# Patient Record
Sex: MALE | Race: WHITE | NOT HISPANIC OR LATINO | Employment: FULL TIME | URBAN - METROPOLITAN AREA
[De-identification: names, ages, dates, MRNs, and addresses within clinical notes are randomized per-mention and may not be internally consistent; named-entity substitution may affect disease eponyms.]

---

## 2024-05-16 ENCOUNTER — HOSPITAL ENCOUNTER (INPATIENT)
Facility: HOSPITAL | Age: 64
LOS: 2 days | Discharge: HOME | DRG: 244 | End: 2024-05-18
Attending: EMERGENCY MEDICINE | Admitting: SURGERY
Payer: COMMERCIAL

## 2024-05-16 ENCOUNTER — APPOINTMENT (EMERGENCY)
Dept: CT IMAGING | Facility: HOSPITAL | Age: 64
DRG: 244 | End: 2024-05-16
Attending: EMERGENCY MEDICINE
Payer: COMMERCIAL

## 2024-05-16 DIAGNOSIS — K57.20 PERFORATION OF SIGMOID COLON DUE TO DIVERTICULITIS: Primary | ICD-10-CM

## 2024-05-16 LAB
ALBUMIN SERPL BCP-MCNC: 3.8 G/DL (ref 3.5–5)
ALP SERPL-CCNC: 44 U/L (ref 34–104)
ALT SERPL W P-5'-P-CCNC: 8 U/L (ref 7–52)
ANION GAP SERPL CALCULATED.3IONS-SCNC: 9 MMOL/L (ref 4–13)
APTT PPP: 38 SECONDS (ref 23–37)
AST SERPL W P-5'-P-CCNC: 10 U/L (ref 13–39)
ATRIAL RATE: 94 BPM
BACTERIA UR QL AUTO: ABNORMAL /HPF
BASOPHILS # BLD AUTO: 0.02 THOUSANDS/ÂΜL (ref 0–0.1)
BASOPHILS NFR BLD AUTO: 0 % (ref 0–1)
BILIRUB SERPL-MCNC: 0.8 MG/DL (ref 0.2–1)
BILIRUB UR QL STRIP: ABNORMAL
BUN SERPL-MCNC: 16 MG/DL (ref 5–25)
CALCIUM SERPL-MCNC: 9.3 MG/DL (ref 8.4–10.2)
CHLORIDE SERPL-SCNC: 101 MMOL/L (ref 96–108)
CLARITY UR: CLEAR
CO2 SERPL-SCNC: 25 MMOL/L (ref 21–32)
COLOR UR: ABNORMAL
CREAT SERPL-MCNC: 1.01 MG/DL (ref 0.6–1.3)
EOSINOPHIL # BLD AUTO: 0.07 THOUSAND/ÂΜL (ref 0–0.61)
EOSINOPHIL NFR BLD AUTO: 1 % (ref 0–6)
ERYTHROCYTE [DISTWIDTH] IN BLOOD BY AUTOMATED COUNT: 11.9 % (ref 11.6–15.1)
GFR SERPL CREATININE-BSD FRML MDRD: 79 ML/MIN/1.73SQ M
GLUCOSE SERPL-MCNC: 143 MG/DL (ref 65–140)
GLUCOSE UR STRIP-MCNC: ABNORMAL MG/DL
HCT VFR BLD AUTO: 42.2 % (ref 36.5–49.3)
HGB BLD-MCNC: 14.3 G/DL (ref 12–17)
HGB UR QL STRIP.AUTO: ABNORMAL
IMM GRANULOCYTES # BLD AUTO: 0.06 THOUSAND/UL (ref 0–0.2)
IMM GRANULOCYTES NFR BLD AUTO: 1 % (ref 0–2)
INR PPP: 1.21 (ref 0.84–1.19)
KETONES UR STRIP-MCNC: ABNORMAL MG/DL
LACTATE SERPL-SCNC: 0.7 MMOL/L (ref 0.5–2)
LEUKOCYTE ESTERASE UR QL STRIP: NEGATIVE
LIPASE SERPL-CCNC: <6 U/L (ref 11–82)
LYMPHOCYTES # BLD AUTO: 1.09 THOUSANDS/ÂΜL (ref 0.6–4.47)
LYMPHOCYTES NFR BLD AUTO: 10 % (ref 14–44)
MCH RBC QN AUTO: 30 PG (ref 26.8–34.3)
MCHC RBC AUTO-ENTMCNC: 33.9 G/DL (ref 31.4–37.4)
MCV RBC AUTO: 89 FL (ref 82–98)
MONOCYTES # BLD AUTO: 0.77 THOUSAND/ÂΜL (ref 0.17–1.22)
MONOCYTES NFR BLD AUTO: 7 % (ref 4–12)
MUCOUS THREADS UR QL AUTO: ABNORMAL
NEUTROPHILS # BLD AUTO: 9.38 THOUSANDS/ÂΜL (ref 1.85–7.62)
NEUTS SEG NFR BLD AUTO: 81 % (ref 43–75)
NITRITE UR QL STRIP: NEGATIVE
NON-SQ EPI CELLS URNS QL MICRO: ABNORMAL /HPF
NRBC BLD AUTO-RTO: 0 /100 WBCS
P AXIS: 83 DEGREES
PH UR STRIP.AUTO: 6 [PH]
PLATELET # BLD AUTO: 351 THOUSANDS/UL (ref 149–390)
PMV BLD AUTO: 10.1 FL (ref 8.9–12.7)
POTASSIUM SERPL-SCNC: 3.8 MMOL/L (ref 3.5–5.3)
PR INTERVAL: 142 MS
PROCALCITONIN SERPL-MCNC: 0.79 NG/ML
PROT SERPL-MCNC: 7.9 G/DL (ref 6.4–8.4)
PROT UR STRIP-MCNC: ABNORMAL MG/DL
PROTHROMBIN TIME: 15.8 SECONDS (ref 11.6–14.5)
QRS AXIS: 50 DEGREES
QRSD INTERVAL: 90 MS
QT INTERVAL: 384 MS
QTC INTERVAL: 480 MS
RBC # BLD AUTO: 4.77 MILLION/UL (ref 3.88–5.62)
RBC #/AREA URNS AUTO: ABNORMAL /HPF
SODIUM SERPL-SCNC: 135 MMOL/L (ref 135–147)
SP GR UR STRIP.AUTO: >=1.03 (ref 1–1.03)
T WAVE AXIS: 68 DEGREES
UROBILINOGEN UR STRIP-ACNC: 2 MG/DL
VENTRICULAR RATE: 94 BPM
WBC # BLD AUTO: 11.39 THOUSAND/UL (ref 4.31–10.16)
WBC #/AREA URNS AUTO: ABNORMAL /HPF

## 2024-05-16 PROCEDURE — 74177 CT ABD & PELVIS W/CONTRAST: CPT

## 2024-05-16 PROCEDURE — NC001 PR NO CHARGE

## 2024-05-16 PROCEDURE — 87040 BLOOD CULTURE FOR BACTERIA: CPT | Performed by: EMERGENCY MEDICINE

## 2024-05-16 PROCEDURE — 36415 COLL VENOUS BLD VENIPUNCTURE: CPT

## 2024-05-16 PROCEDURE — 84145 PROCALCITONIN (PCT): CPT | Performed by: EMERGENCY MEDICINE

## 2024-05-16 PROCEDURE — 93010 ELECTROCARDIOGRAM REPORT: CPT | Performed by: INTERNAL MEDICINE

## 2024-05-16 PROCEDURE — 85025 COMPLETE CBC W/AUTO DIFF WBC: CPT | Performed by: EMERGENCY MEDICINE

## 2024-05-16 PROCEDURE — 99222 1ST HOSP IP/OBS MODERATE 55: CPT | Performed by: SURGERY

## 2024-05-16 PROCEDURE — 96365 THER/PROPH/DIAG IV INF INIT: CPT

## 2024-05-16 PROCEDURE — 99284 EMERGENCY DEPT VISIT MOD MDM: CPT

## 2024-05-16 PROCEDURE — 99285 EMERGENCY DEPT VISIT HI MDM: CPT | Performed by: EMERGENCY MEDICINE

## 2024-05-16 PROCEDURE — 83605 ASSAY OF LACTIC ACID: CPT | Performed by: EMERGENCY MEDICINE

## 2024-05-16 PROCEDURE — 83690 ASSAY OF LIPASE: CPT | Performed by: EMERGENCY MEDICINE

## 2024-05-16 PROCEDURE — 81001 URINALYSIS AUTO W/SCOPE: CPT | Performed by: EMERGENCY MEDICINE

## 2024-05-16 PROCEDURE — 96361 HYDRATE IV INFUSION ADD-ON: CPT

## 2024-05-16 PROCEDURE — 85730 THROMBOPLASTIN TIME PARTIAL: CPT | Performed by: EMERGENCY MEDICINE

## 2024-05-16 PROCEDURE — 80053 COMPREHEN METABOLIC PANEL: CPT | Performed by: EMERGENCY MEDICINE

## 2024-05-16 PROCEDURE — 85610 PROTHROMBIN TIME: CPT | Performed by: EMERGENCY MEDICINE

## 2024-05-16 PROCEDURE — 93005 ELECTROCARDIOGRAM TRACING: CPT

## 2024-05-16 RX ORDER — ONDANSETRON 2 MG/ML
4 INJECTION INTRAMUSCULAR; INTRAVENOUS EVERY 6 HOURS PRN
Status: DISCONTINUED | OUTPATIENT
Start: 2024-05-16 | End: 2024-05-18 | Stop reason: HOSPADM

## 2024-05-16 RX ORDER — HYDROMORPHONE HCL/PF 1 MG/ML
0.5 SYRINGE (ML) INJECTION EVERY 4 HOURS PRN
Status: DISCONTINUED | OUTPATIENT
Start: 2024-05-16 | End: 2024-05-18

## 2024-05-16 RX ORDER — HEPARIN SODIUM 5000 [USP'U]/ML
5000 INJECTION, SOLUTION INTRAVENOUS; SUBCUTANEOUS EVERY 8 HOURS SCHEDULED
Status: DISCONTINUED | OUTPATIENT
Start: 2024-05-16 | End: 2024-05-18 | Stop reason: HOSPADM

## 2024-05-16 RX ORDER — SODIUM CHLORIDE 9 MG/ML
125 INJECTION, SOLUTION INTRAVENOUS CONTINUOUS
Status: DISCONTINUED | OUTPATIENT
Start: 2024-05-16 | End: 2024-05-17

## 2024-05-16 RX ADMIN — HYDROMORPHONE HYDROCHLORIDE 0.5 MG: 1 INJECTION, SOLUTION INTRAMUSCULAR; INTRAVENOUS; SUBCUTANEOUS at 18:54

## 2024-05-16 RX ADMIN — HEPARIN SODIUM 5000 UNITS: 5000 INJECTION, SOLUTION INTRAVENOUS; SUBCUTANEOUS at 21:44

## 2024-05-16 RX ADMIN — PIPERACILLIN SODIUM AND TAZOBACTAM SODIUM 4.5 G: 4; .5 INJECTION, POWDER, LYOPHILIZED, FOR SOLUTION INTRAVENOUS at 15:40

## 2024-05-16 RX ADMIN — IOHEXOL 100 ML: 350 INJECTION, SOLUTION INTRAVENOUS at 14:14

## 2024-05-16 RX ADMIN — SODIUM CHLORIDE 1000 ML: 0.9 INJECTION, SOLUTION INTRAVENOUS at 14:08

## 2024-05-16 RX ADMIN — ONDANSETRON 4 MG: 2 INJECTION INTRAMUSCULAR; INTRAVENOUS at 18:53

## 2024-05-16 RX ADMIN — SODIUM CHLORIDE 125 ML/HR: 0.9 INJECTION, SOLUTION INTRAVENOUS at 21:43

## 2024-05-16 RX ADMIN — SODIUM CHLORIDE 125 ML/HR: 0.9 INJECTION, SOLUTION INTRAVENOUS at 17:26

## 2024-05-16 RX ADMIN — PIPERACILLIN SODIUM AND TAZOBACTAM SODIUM 4.5 G: 4; .5 INJECTION, POWDER, LYOPHILIZED, FOR SOLUTION INTRAVENOUS at 21:41

## 2024-05-16 NOTE — ED PROVIDER NOTES
History  Chief Complaint   Patient presents with    Constipation     Pt to er with reports of being constipated since flying in from [place] on Sunday. Also feels gassy. Hx of divertixulitits     63 yom with crampy, lower abdominal pain started 4 days ago. Is passing gas. PSH appy. Denies  symptoms. No vomiting. Hx of diverticulitis.         None       Past Medical History:   Diagnosis Date    Diverticulitis        History reviewed. No pertinent surgical history.    History reviewed. No pertinent family history.  I have reviewed and agree with the history as documented.    E-Cigarette/Vaping     E-Cigarette/Vaping Substances    Nicotine Yes      Social History     Tobacco Use    Smoking status: Never    Smokeless tobacco: Never   Substance Use Topics    Alcohol use: Never    Drug use: Never       Review of Systems   Gastrointestinal:  Positive for abdominal pain.       Physical Exam  Physical Exam  Vitals and nursing note reviewed.   Constitutional:       General: He is not in acute distress.     Appearance: He is well-developed.   HENT:      Head: Normocephalic and atraumatic.      Right Ear: External ear normal.      Left Ear: External ear normal.      Nose: Nose normal.   Eyes:      General: No scleral icterus.  Pulmonary:      Effort: Pulmonary effort is normal. No respiratory distress.   Abdominal:      General: There is no distension.      Palpations: Abdomen is soft.      Tenderness: There is abdominal tenderness.      Comments: TTP most prominent LLQ   Musculoskeletal:         General: No deformity. Normal range of motion.      Cervical back: Normal range of motion and neck supple.   Skin:     General: Skin is warm.      Findings: No rash.   Neurological:      General: No focal deficit present.      Mental Status: He is alert.      Gait: Gait normal.   Psychiatric:         Mood and Affect: Mood normal.         Vital Signs  ED Triage Vitals   Temperature Pulse Respirations Blood Pressure SpO2   05/16/24  1150 05/16/24 1150 05/16/24 1150 05/16/24 1150 05/16/24 1150   97.6 °F (36.4 °C) 105 18 (!) 144/103 98 %      Temp Source Heart Rate Source Patient Position - Orthostatic VS BP Location FiO2 (%)   05/16/24 1150 05/16/24 1150 05/16/24 1150 05/16/24 1150 --   Temporal Monitor Sitting Right arm       Pain Score       05/16/24 1400       No Pain           Vitals:    05/16/24 1430 05/16/24 1445 05/16/24 1515 05/16/24 1530   BP: 112/73   152/74   Pulse: 105 97 96 98   Patient Position - Orthostatic VS:             Visual Acuity  Visual Acuity      Flowsheet Row Most Recent Value   L Pupil Size (mm) 3   R Pupil Size (mm) 3            ED Medications  Medications   sodium chloride 0.9 % bolus 1,000 mL (0 mL Intravenous Stopped 5/16/24 1537)   iohexol (OMNIPAQUE) 350 MG/ML injection (MULTI-DOSE) 100 mL (100 mL Intravenous Given 5/16/24 1414)   piperacillin-tazobactam (ZOSYN) IVPB 4.5 g (4.5 g Intravenous New Bag 5/16/24 1540)       Diagnostic Studies  Results Reviewed       Procedure Component Value Units Date/Time    Lactic acid [[id number]]     Lab Status: No result Specimen: Blood     Procalcitonin [[id number]]     Lab Status: No result Specimen: Blood     Protime-INR [[id number]]     Lab Status: No result Specimen: Blood     APTT [[id number]]     Lab Status: No result Specimen: Blood     Blood culture #1 [[id number]]     Lab Status: No result Specimen: Blood     Blood culture #2 [[id number]]     Lab Status: No result Specimen: Blood     Urine Microscopic [[id number]]  (Abnormal) Collected: 05/16/24 1312    Lab Status: Final result Specimen: Urine, Clean Catch Updated: 05/16/24 1341     RBC, UA 4-10 /hpf      WBC, UA 0-1 /hpf      Epithelial Cells Occasional /hpf      Bacteria, UA Occasional /hpf      MUCUS THREADS Occasional    UA w Reflex to Microscopic w Reflex to Culture [[id number]]  (Abnormal) Collected: 05/16/24 1312    Lab Status: Final result Specimen: Urine, Clean Catch Updated: 05/16/24 4464     Color, UA Dark Yellow      Clarity, UA Clear     Specific Gravity, UA >=1.030     pH, UA 6.0     Leukocytes, UA Negative     Nitrite, UA Negative     Protein,  (2+) mg/dl      Glucose, UA 30 (3/100%) mg/dl      Ketones, UA 10 (1+) mg/dl      Urobilinogen, UA 2.0 mg/dl      Bilirubin, UA Small     Occult Blood, UA Large    Comprehensive metabolic panel [[id number]]  (Abnormal) Collected: 05/16/24 1157    Lab Status: Final result Specimen: Blood from Arm, Right Updated: 05/16/24 1226     Sodium 135 mmol/L      Potassium 3.8 mmol/L      Chloride 101 mmol/L      CO2 25 mmol/L      ANION GAP 9 mmol/L      BUN 16 mg/dL      Creatinine 1.01 mg/dL      Glucose 143 mg/dL      Calcium 9.3 mg/dL      AST 10 U/L      ALT 8 U/L      Alkaline Phosphatase 44 U/L      Total Protein 7.9 g/dL      Albumin 3.8 g/dL      Total Bilirubin 0.80 mg/dL      eGFR 79 ml/min/1.73sq m     Narrative:      National Kid[employer] Foundation guidelines for Chronic Kidney Disease (CKD):     Stage 1 with normal or high GFR (GFR > 90 mL/min/1.73 square meters)    Stage 2 Mild CKD (GFR = 60-89 mL/min/1.73 square meters)    Stage 3A Moderate CKD (GFR = 45-59 mL/min/1.73 square meters)    Stage 3B Moderate CKD (GFR = 30-44 mL/min/1.73 square meters)    Stage 4 Severe CKD (GFR = 15-29 mL/min/1.73 square meters)    Stage 5 End Stage CKD (GFR <15 mL/min/1.73 square meters)  Note: GFR calculation is accurate only with a steady state creatinine    Lipase [[id number]]  (Abnormal) Collected: 05/16/24 1157    Lab Status: Final result Specimen: Blood from Arm, Right Updated: 05/16/24 1226     Lipase <6 u/L     CBC and differential [[id number]]  (Abnormal) Collected: 05/16/24 1157    Lab Status: Final result Specimen: Blood from Arm, Right Updated: 05/16/24 1203     WBC 11.39 Thousand/uL      RBC 4.77 Million/uL      Hemoglobin 14.3 g/dL      Hematocrit 42.2 %      MCV 89 fL      MCH 30.0 pg      MCHC 33.9 g/dL      RDW 11.9 %      MPV 10.1 fL      Platelets 351 Thousands/uL      nRBC  0 /100 WBCs      Segmented % 81 %      Immature Grans % 1 %      Lymphocytes % 10 %      Monocytes % 7 %      Eosinophils Relative 1 %      Basophils Relative 0 %      Absolute Neutrophils 9.38 Thousands/µL      Absolute Immature Grans 0.06 Thousand/uL      Absolute Lymphocytes 1.09 Thousands/µL      Absolute Monocytes 0.77 Thousand/µL      Eosinophils Absolute 0.07 Thousand/µL      Basophils Absolute 0.02 Thousands/µL                    CT abdomen pelvis with contrast   Final Result by Rosario Jennings MD (05/16 1510)      Marked inflammatory fat stranding adjacent to the sigmoid colon, with small rim-enhancing fluid collections in an area of multiple diverticula. Few extraluminal gas locules are noted. Findings most likely represent acute diverticulitis of the sigmoid    colon with perforation      Mild circumferential bladder wall thickening, which might be reactive due to adjacent diverticulitis but recommend correlation with urinalysis to exclude coexisting cystitis.      The study was marked in EPIC for immediate notification.         Workstation performed: [id number]                    Procedures  Procedures         ED Course                               SBIRT 20yo+      Flowsheet Row Most Recent Value   Initial Alcohol Screen: US AUDIT-C     1. How often do you have a drink containing alcohol? 0 Filed at: 05/16/2024 1151   2. How many drinks containing alcohol do you have on a typical day you are drinking?  0 Filed at: 05/16/2024 1151   3a. Male UNDER 65: How often do you have five or more drinks on one occasion? 0 Filed at: 05/16/2024 1151   3b. FEMALE Any Age, or MALE 65+: How often do you have 4 or more drinks on one occassion? 0 Filed at: 05/16/2024 1151   Audit-C Score 0 Filed at: 05/16/2024 1151   DALY: How many times in the past year have you...    Used an illegal drug or used a prescription medication for non-medical reasons? Never Filed at: 05/16/2024 1151                      Medical Decision  Making  63 yom with lower abd pain. Ddx diverticulitis, infectious, biliary, ileus/obstruction, constipation. Labs, UA, CTAP, symptom control. Reassess.     Discussed results with surgical team and patient.  Administer IV antibiotics and admission.    Amount and/or Complexity of Data Reviewed  Labs: ordered.  Radiology: ordered.    Risk  Prescription drug management.  Decision regarding hospitalization.             Disposition  Final diagnoses:   Perforation of sigmoid colon due to diverticulitis     Time reflects when diagnosis was documented in both MDM as applicable and the Disposition within this note       Time User Action Codes Description Comment    5/16/2024  3:54 PM Carson Bautista Add [K57.20] Perforation of sigmoid colon due to diverticulitis           ED Disposition       ED Disposition   Admit    Condition   Stable    Date/Time   Thu May 16, 2024 1616    Comment   Case was discussed with gen surg and the patient's admission status was agreed to be Admission Status: inpatient status to the service of Dr. Franks .               Follow-up Information    None         Patient's Medications    No medications on file       No discharge procedures on file.    PDMP Review       None            ED Provider  Electronically Signed by             Carson Bautista DO  05/16/24 3506

## 2024-05-16 NOTE — CONSULTS
"Consultation - General Surgery   Linus Guzmán 63 y.o. male MRN: [id number]  Unit/Bed#: ED 08 Encounter: [id number]    Assessment & Plan     Assessment:    62 y/o M PMHx diverticulosis now presenting with abdominal cramping and constipation since Sunday.    Acute Diverticulitis of sigmoid colon with perforation     Abdominal pain initially began Sunday evening, previous episode of diverticulitis approximately 4 years ago. Patient was given abx and discharged home.   Last colonoscopy approximately 5 years ago, significant for diverticulosis. Repeat in 10 years.   Passing some flatus, no BM since Sunday.   Abdominal exam with TTP LLQ and RLQ. Distension. Hypoactive bowel sounds. Tympanic. No guarding, rebound, or peritoneal signs.   Afebrile, VSS   WBC 11.39   CT A/P   Marked inflammatory fat stranding adjacent to the sigmoid colon, with small rim-enhancing fluid collections in an area of multiple diverticula. Few extraluminal gas locules are noted. Findings most likely represent acute diverticulitis of the sigmoid colon with perforation.   Mild circumferential bladder wall thickening, which might be reactive due to adjacent diverticulitis but recommend correlation with urinalysis to exclude coexisting cystitis.    Plan:    No acute surgical intervention indicated at this time, plan for conservative management initially.   IVF, IV abx   NPO  Pain control   Nutrition consult   Trend labs, vitals, abdominal exam   Lactate ordered, trend   Will need colonoscopy in 6-8 weeks outpatient   Outpatient surgical f/u after colonoscopy   Discussed with attending on call       History of Present Illness     HPI:  Linus Guzmán is a 63 y.o. male with pmhx significant for diverticulosis who presented to ED with concerns of abdominal cramping and constipation since Sunday. Patient stated Sunday evening he began to develop \"gas like pains\" in his lower abdomen, radiating from the LLQ to RLQ. OTC pain medication has not " provided him much relief. Denies any fevers, chills, N/V. Patient also mentions he has not had a BM since Sunday and has not tried any OTC bowel regimens for relief. Patient has been passing gas, reports pain and cramping sensation when it does occur. He recalls his last colonoscopy was back about 5 years ago, findings consistent with diverticulosis and was encouraged to have repeat colonoscopy in 10 years. Patient adds he does not follow up with primary care provider outpatient, mentions he has no significant past medical history and does not take any home medications. Patient denies any headaches, SOB, chest pain, back pain, or changes in bladder or bowel habits not previously mentioned above. Denies further questions or complaints.     Consults    Review of Systems   Constitutional:  Negative for chills and fever.   Respiratory:  Negative for shortness of breath.    Cardiovascular:  Negative for chest pain.   Gastrointestinal:  Positive for abdominal distention, abdominal pain and constipation. Negative for blood in stool, diarrhea and nausea.   Genitourinary:  Negative for difficulty urinating.   Musculoskeletal:  Negative for arthralgias.   All other systems reviewed and are negative.      Historical Information   Past Medical History:   Diagnosis Date    Diverticulitis      History reviewed. No pertinent surgical history.  Social History   Social History     Substance and Sexual Activity   Alcohol Use Never     Social History     Substance and Sexual Activity   Drug Use Never     E-Cigarette/Vaping     E-Cigarette/Vaping Substances    Nicotine Yes      Social History     Tobacco Use   Smoking Status Never   Smokeless Tobacco Never     Family History: non-contributory    Meds/Allergies   all current active meds have been reviewed  No Known Allergies    Objective   First Vitals:   Blood Pressure: (!) 144/103 (05/16/24 1150)  Pulse: 105 (05/16/24 1150)  Temperature: 97.6 °F (36.4 °C) (05/16/24 1150)  Temp Source:  "Temporal (05/16/24 1150)  Respirations: 18 (05/16/24 1150)  Height: 5' 6\" (167.6 cm) (05/16/24 1150)  Weight - Scale: 72.6 kg (160 lb) (05/16/24 1150)  SpO2: 98 % (05/16/24 1150)    Current Vitals:   Blood Pressure: 152/74 (05/16/24 1530)  Pulse: 98 (05/16/24 1530)  Temperature: 97.6 °F (36.4 °C) (05/16/24 1150)  Temp Source: Temporal (05/16/24 1150)  Respirations: 16 (05/16/24 1400)  Height: 5' 6\" (167.6 cm) (05/16/24 1150)  Weight - Scale: 72.6 kg (160 lb) (05/16/24 1150)  SpO2: 98 % (05/16/24 1530)      Intake/Output Summary (Last 24 hours) at 5/16/2024 1545  Last data filed at 5/16/2024 1537  Gross per 24 hour   Intake 1000 ml   Output --   Net 1000 ml       Invasive Devices       Peripheral Intravenous Line  Duration             Peripheral IV 05/16/24 Right Antecubital <1 day                    Physical Exam  Vitals and nursing note reviewed.   Constitutional:       General: He is not in acute distress.     Appearance: He is normal weight.   HENT:      Right Ear: External ear normal.      Left Ear: External ear normal.      Nose: Nose normal.   Eyes:      Pupils: Pupils are equal, round, and reactive to light.   Cardiovascular:      Rate and Rhythm: Normal rate and regular rhythm.      Pulses: Normal pulses.   Pulmonary:      Effort: Pulmonary effort is normal. No respiratory distress.      Breath sounds: Normal breath sounds.   Abdominal:      General: Bowel sounds are decreased. There is distension.      Palpations: Abdomen is soft.      Tenderness: There is abdominal tenderness in the right lower quadrant and left lower quadrant. There is no right CVA tenderness, left CVA tenderness, guarding or rebound.   Musculoskeletal:         General: Normal range of motion.      Cervical back: Normal range of motion.   Skin:     General: Skin is warm.      Coloration: Skin is not jaundiced or pale.   Neurological:      General: No focal deficit present.      Mental Status: He is alert.   Psychiatric:         Mood and " Affect: Mood normal.         Behavior: Behavior normal.         Thought Content: Thought content normal.         Judgment: Judgment normal.         Lab Results: I have personally reviewed pertinent lab results.  , CBC:   Lab Results   Component Value Date    WBC 11.39 (H) 05/16/2024    HGB 14.3 05/16/2024    HCT 42.2 05/16/2024    MCV 89 05/16/2024     05/16/2024    RBC 4.77 05/16/2024    MCH 30.0 05/16/2024    MCHC 33.9 05/16/2024    RDW 11.9 05/16/2024    MPV 10.1 05/16/2024    NRBC 0 05/16/2024   , CMP:   Lab Results   Component Value Date    SODIUM 135 05/16/2024    K 3.8 05/16/2024     05/16/2024    CO2 25 05/16/2024    BUN 16 05/16/2024    CREATININE 1.01 05/16/2024    CALCIUM 9.3 05/16/2024    AST 10 (L) 05/16/2024    ALT 8 05/16/2024    ALKPHOS 44 05/16/2024    EGFR 79 05/16/2024     Imaging: I have personally reviewed pertinent reports.    EKG, Pathology, and Other Studies: I have personally reviewed pertinent reports.      Counseling / Coordination of Care  Total floor / unit time spent today 40 minutes.  Greater than 50% of total time was spent with the patient and / or family counseling and / or coordination of care.  A description of the counseling / coordination of care: 40.

## 2024-05-17 LAB
ANION GAP SERPL CALCULATED.3IONS-SCNC: 10 MMOL/L (ref 4–13)
BUN SERPL-MCNC: 12 MG/DL (ref 5–25)
CALCIUM SERPL-MCNC: 8.2 MG/DL (ref 8.4–10.2)
CHLORIDE SERPL-SCNC: 106 MMOL/L (ref 96–108)
CO2 SERPL-SCNC: 22 MMOL/L (ref 21–32)
CREAT SERPL-MCNC: 0.79 MG/DL (ref 0.6–1.3)
ERYTHROCYTE [DISTWIDTH] IN BLOOD BY AUTOMATED COUNT: 11.9 % (ref 11.6–15.1)
GFR SERPL CREATININE-BSD FRML MDRD: 96 ML/MIN/1.73SQ M
GLUCOSE SERPL-MCNC: 88 MG/DL (ref 65–140)
HCT VFR BLD AUTO: 34.5 % (ref 36.5–49.3)
HGB BLD-MCNC: 11.7 G/DL (ref 12–17)
MAGNESIUM SERPL-MCNC: 2.1 MG/DL (ref 1.9–2.7)
MCH RBC QN AUTO: 30.1 PG (ref 26.8–34.3)
MCHC RBC AUTO-ENTMCNC: 33.9 G/DL (ref 31.4–37.4)
MCV RBC AUTO: 89 FL (ref 82–98)
PHOSPHATE SERPL-MCNC: 3.3 MG/DL (ref 2.3–4.1)
PLATELET # BLD AUTO: 293 THOUSANDS/UL (ref 149–390)
PMV BLD AUTO: 10.1 FL (ref 8.9–12.7)
POTASSIUM SERPL-SCNC: 3.5 MMOL/L (ref 3.5–5.3)
RBC # BLD AUTO: 3.89 MILLION/UL (ref 3.88–5.62)
SODIUM SERPL-SCNC: 138 MMOL/L (ref 135–147)
WBC # BLD AUTO: 7.6 THOUSAND/UL (ref 4.31–10.16)

## 2024-05-17 PROCEDURE — 84100 ASSAY OF PHOSPHORUS: CPT | Performed by: SURGERY

## 2024-05-17 PROCEDURE — 99232 SBSQ HOSP IP/OBS MODERATE 35: CPT | Performed by: SURGERY

## 2024-05-17 PROCEDURE — 83735 ASSAY OF MAGNESIUM: CPT | Performed by: SURGERY

## 2024-05-17 PROCEDURE — 80048 BASIC METABOLIC PNL TOTAL CA: CPT | Performed by: SURGERY

## 2024-05-17 PROCEDURE — 85027 COMPLETE CBC AUTOMATED: CPT | Performed by: SURGERY

## 2024-05-17 RX ORDER — METRONIDAZOLE 500 MG/100ML
500 INJECTION, SOLUTION INTRAVENOUS EVERY 8 HOURS
Status: DISCONTINUED | OUTPATIENT
Start: 2024-05-17 | End: 2024-05-18 | Stop reason: HOSPADM

## 2024-05-17 RX ORDER — CEFTRIAXONE 1 G/50ML
1000 INJECTION, SOLUTION INTRAVENOUS EVERY 24 HOURS
Status: DISCONTINUED | OUTPATIENT
Start: 2024-05-17 | End: 2024-05-18 | Stop reason: HOSPADM

## 2024-05-17 RX ADMIN — HEPARIN SODIUM 5000 UNITS: 5000 INJECTION, SOLUTION INTRAVENOUS; SUBCUTANEOUS at 14:21

## 2024-05-17 RX ADMIN — SODIUM CHLORIDE 125 ML/HR: 0.9 INJECTION, SOLUTION INTRAVENOUS at 15:08

## 2024-05-17 RX ADMIN — SODIUM CHLORIDE 125 ML/HR: 0.9 INJECTION, SOLUTION INTRAVENOUS at 03:06

## 2024-05-17 RX ADMIN — PIPERACILLIN SODIUM AND TAZOBACTAM SODIUM 4.5 G: 4; .5 INJECTION, POWDER, LYOPHILIZED, FOR SOLUTION INTRAVENOUS at 05:39

## 2024-05-17 RX ADMIN — HEPARIN SODIUM 5000 UNITS: 5000 INJECTION, SOLUTION INTRAVENOUS; SUBCUTANEOUS at 20:46

## 2024-05-17 RX ADMIN — METRONIDAZOLE 500 MG: 500 INJECTION, SOLUTION INTRAVENOUS at 20:45

## 2024-05-17 RX ADMIN — METRONIDAZOLE 500 MG: 500 INJECTION, SOLUTION INTRAVENOUS at 14:15

## 2024-05-17 RX ADMIN — CEFTRIAXONE 1000 MG: 1 INJECTION, SOLUTION INTRAVENOUS at 14:24

## 2024-05-17 RX ADMIN — HYDROMORPHONE HYDROCHLORIDE 0.5 MG: 1 INJECTION, SOLUTION INTRAMUSCULAR; INTRAVENOUS; SUBCUTANEOUS at 03:01

## 2024-05-17 RX ADMIN — HEPARIN SODIUM 5000 UNITS: 5000 INJECTION, SOLUTION INTRAVENOUS; SUBCUTANEOUS at 05:41

## 2024-05-17 NOTE — CASE MANAGEMENT
Case Management Assessment & Discharge Planning Note    Patient name Linus Guzmán  Location /-01 MRN [id number]  : 1960 Date 2024       Current Admission Date: 2024  Current Admission Diagnosis:Perforation of sigmoid colon due to diverticulitis   Patient Active Problem List    Diagnosis Date Noted Date Diagnosed    Perforation of sigmoid colon due to diverticulitis 2024       LOS (days): 1  Geometric Mean LOS (GMLOS) (days):   Days to GMLOS:     OBJECTIVE:    Risk of Unplanned Readmission Score: 11.65         Current admission status: Inpatient       Preferred Pharmacy: No Pharmacies Listed  Primary Care Provider: No primary care provider on file.    Primary Insurance: [employer] [facility]  Secondary Insurance:     ASSESSMENT:  Active Health Care Proxies       [facility] Agent - [facility]   Primary Phone: [phone] (Mobile)                 Advance Directives  Does patient have a Health Care POA?: No  Was patient offered paperwork?: Yes         Readmission Root Cause  30 Day Readmission: No    Patient Information  Admitted from:: Home  Mental Status: Alert  During Assessment patient was accompanied by: Not accompanied during assessment  Assessment information provided by:: Patient  Primary Caregiver: Self  Support Systems: Self, Spouse/significant other, Friend, Children  What city do you live in?: Visiting from [place]  Home entry access options. Select all that apply.: No steps to enter home  Type of Current Residence: 2 story home  Upon entering residence, is there a bedroom on the main floor (no further steps)?: Yes  Upon entering residence, is there a bathroom on the main floor (no further steps)?: Yes  Living Arrangements: Lives Alone  Is patient a ?: No    Activities of Daily Living Prior to Admission  Functional Status: Independent  Completes ADLs independently?: Yes  Ambulates independently?: Yes  Does patient use assisted  devices?: No  Does patient currently own DME?: No  Does patient have a history of Outpatient Therapy (PT/OT)?: No  Does the patient have a history of Short-Term Rehab?: No  Does patient have a history of HHC?: No  Does patient currently have HHC?: No         Patient Information Continued  Income Source: Employed  Does patient have prescription coverage?: Yes  Does patient receive dialysis treatments?: No  Does patient have a history of substance abuse?: Yes  Historical substance use preference: Alcohol/ETOH  Is patient currently in treatment for substance abuse?: N/A - sober  Does patient have a history of Mental Health Diagnosis?: No         Means of Transportation  Means of Transport to Appts:: Drives Self      Social Determinants of Health (SDOH)      Flowsheet Row Most Recent Value   Housing Stability    In the last 12 months, was there a time when you were not able to pay the mortgage or rent on time? N   In the past 12 months, how many times have you moved where you were living? 1   At any time in the past 12 months, were you homeless or living in a shelter (including now)? N   Transportation Needs    In the past 12 months, has lack of transportation kept you from medical appointments or from getting medications? no   In the past 12 months, has lack of transportation kept you from meetings, work, or from getting things needed for daily living? No   Food Insecurity    Within the past 12 months, you worried that your food would run out before you got the money to buy more. Never true   Within the past 12 months, the food you bought just didn't last and you didn't have money to get more. Never true   Utilities    In the past 12 months has the electric, gas, oil, or water company threatened to shut off services in your home? No            DISCHARGE DETAILS:    Discharge planning discussed with:: Pt at bedside  [place] of Choice: Yes     CM contacted family/caregiver?: No- see comments  Were Treatment Team  discharge recommendations reviewed with patient/caregiver?: Yes  Did patient/caregiver verbalize understanding of patient care needs?: Yes  Were patient/caregiver advised of the risks associated with not following Treatment Team discharge recommendations?: Yes                        Additional Comments: Met with pt at bedside to review CM role and possible discharge planning needs. Pt lives in [place] but is here visitin his sister. Pt lives alone but is staying with his sister while visiting. Pt has been independent with all ADL care prior to admission. Pt has no hx with DME, HHC or STR placement. Pt has no concerns for his ability to return home safely. Made pt aware of CM availability for ongoing discharge planning needs.

## 2024-05-17 NOTE — PROGRESS NOTES
"Progress Note - General Surgery   Linus Guzmán 63 y.o. male MRN: [id number]  Unit/Bed#: -01 Encounter: [id number]    Assessment:  63-year-old with PMH of diverticulosis and 1 prior episode of uncomplicated diverticulitis approximately 5 years ago admitted with 5-day history of lower abdominal pain and cramping with associated constipation.   Patient is visiting this area.  He resides in [place].     Acute diverticulitis of sigmoid colon with perforation  -CT A/P   Marked inflammatory fat stranding adjacent to the sigmoid colon, with small rim-enhancing fluid collections in an area of multiple diverticula. Few extraluminal gas locules are noted. Findings most likely represent acute diverticulitis of the sigmoid colon with perforation.   Mild circumferential bladder wall thickening, which might be reactive due to adjacent diverticulitis but recommend correlation with urinalysis to exclude coexisting cystitis.    -Last colonoscopy 5 years ago, 10 yr recall  -Abdomen with lower abdominal tenderness without guarding or peritoneal signs  -Leukocytosis improved  -Afebrile, VSS  Continue plan for conservative management  Advance to clear liquid diet  Continue IV antibiotics, changed to oral for discharge  Pain control as needed  Trend abdominal exam, labs, VS  Will need outpatient colonoscopy in 6 to 8 weeks followed by surgical follow-up in [place]. Plan discussed with patient.  All questions answered    Subjective/Objective   Chief Complaint: abdominal pain    Subjective: Still with abdominal tenderness but improved from yesterday.  No fevers or chills.  Passing gas.  No acute events overnight    Objective:     Blood pressure 129/94, pulse 88, temperature 97.7 °F (36.5 °C), temperature source Temporal, resp. rate 16, height 5' 6\" (1.676 m), weight 72.6 kg (160 lb), SpO2 97%.,Body mass index is 25.82 kg/m².      Intake/Output Summary (Last 24 hours) at 5/17/2024 0731  Last data filed at 5/17/2024 0539  Gross " per 24 hour   Intake 2308.34 ml   Output --   Net 2308.34 ml       Invasive Devices       Peripheral Intravenous Line  Duration             Peripheral IV 05/16/24 Right Antecubital <1 day                    Physical Exam:   General appearance: alert and oriented, in no acute distress  Head: Normocephalic, without obvious abnormality, atraumatic, sclerae anicteric, mucous membranes moist  Neck: no JVD and supple, symmetrical, trachea midline  Lungs: clear to auscultation, no wheezes or rales  Heart:   Regular rate and rhythm, S1-S2 normal, no murmur  Abdomen:   Flat, soft, tenderness across lower abdomen without rebound or guarding, no peritoneal signs, few bowel sounds  Extremities:   No edema, redness or tenderness in the calves or thighs  Skin: Warm, dry  Nursing notes and vital signs reviewed        Lab, Imaging and other studies:I have personally reviewed pertinent lab results.  , CBC:   Lab Results   Component Value Date    WBC 7.60 05/17/2024    HGB 11.7 (L) 05/17/2024    HCT 34.5 (L) 05/17/2024    MCV 89 05/17/2024     05/17/2024    RBC 3.89 05/17/2024    MCH 30.1 05/17/2024    MCHC 33.9 05/17/2024    RDW 11.9 05/17/2024    MPV 10.1 05/17/2024    NRBC 0 05/16/2024   , CMP:   Lab Results   Component Value Date    SODIUM 138 05/17/2024    K 3.5 05/17/2024     05/17/2024    CO2 22 05/17/2024    BUN 12 05/17/2024    CREATININE 0.79 05/17/2024    CALCIUM 8.2 (L) 05/17/2024    AST 10 (L) 05/16/2024    ALT 8 05/16/2024    ALKPHOS 44 05/16/2024    EGFR 96 05/17/2024     VTE Pharmacologic Prophylaxis: Heparin  VTE Mechanical Prophylaxis: sequential compression device    Arabella Cornejo

## 2024-05-17 NOTE — UTILIZATION REVIEW
Initial Clinical Review    Admission: Date/Time/Statement:   Admission Orders (From admission, onward)       Ordered        05/16/24 1611  INPATIENT ADMISSION  Once                          Orders Placed This Encounter   Procedures    INPATIENT ADMISSION     Standing Status:   Standing     Number of Occurrences:   1     Order Specific Question:   Level of Care     Answer:   Med Surg [16]     Order Specific Question:   Estimated length of stay     Answer:   More than 2 Midnights     Order Specific Question:   Certification     Answer:   I certify that inpatient services are medically necessary for this patient for a duration of greater than two midnights. See H&P and MD Progress Notes for additional information about the patient's course of treatment.     ED Arrival Information       Expected   -    Arrival   5/16/2024 11:43    Acuity   Urgent              Means of arrival   Walk-In    Escorted by   Family Member    Service   Surgery-General    Admission type   Emergency              Arrival complaint   constipation  gassy             Chief Complaint   Patient presents with    Constipation     Pt to er with reports of being constipated since flying in from [place] on Sunday. Also feels gassy. Hx of divertixulitits       Initial Presentation: 63 y.o. male   to ED via walk in from home.    Admitted to inpatient with Dx: Acute diverticulitis with contained perforation.  Presented to ED with   Crampy lower abdominal pain starting 4 days prior to arrival.   No BM.  + flatus.  PMHx:Diverticulitis about 4 years ago. On exam: abdominal tenderness most prominent in LLQ.  Hypoactive bowel sounds.    Hypertensive.  Wbc 11.39.   Procalcitonin 0.79.  glucose 143.   Imaging shows likely acute diverticulitis of the sigmoid  with perforation.  Possible cystitis.   ED treatment: 1 liter IVF bolus, started on Zosyn, given Dilaudid and Zofran.    Plan includes NPO, IVF, IV antibiotics.  No surgical intervention at this time.  Analgesia  and antiemetic as needed.   Trend abdominal exam.  Will need OP colonoscopy in 6 to 8 weeks .     Date: 5/17/24   Day 2: continued abdominal pain but improved from admission.  + flatus.   On exam of abdomen - Flat, soft, tenderness across lower abdomen without rebound or guarding, no peritoneal signs, few bowel sounds.  Wbc 7.60.   H&H 11.7/3.5.    today to start clears.  Continue IVF, IV antibiotics.  Serial abdominal exams.   Analgesia and antiemetics as needed.     ED Triage Vitals   Temperature Pulse Respirations Blood Pressure SpO2   05/16/24 1150 05/16/24 1150 05/16/24 1150 05/16/24 1150 05/16/24 1150   97.6 °F (36.4 °C) 105 18 (!) 144/103 98 %      Temp Source Heart Rate Source Patient Position - Orthostatic VS BP Location FiO2 (%)   05/16/24 1150 05/16/24 1150 05/16/24 1150 05/16/24 1150 --   Temporal Monitor Sitting Right arm       Pain Score       05/16/24 1400       No Pain          Wt Readings from Last 1 Encounters:   05/16/24 72.6 kg (160 lb)     Additional Vital Signs:   05/17/24 08:14:37 97.5 °F (36.4 °C) 87 18 138/81 100 97 % -- --   05/16/24 2145 -- -- -- -- -- -- None (Room air) --   05/16/24 21:11:45 97.7 °F (36.5 °C) 88 16 129/94 106 97 % -- --   05/16/24 1900 -- 89 16 134/75 97 97 % -- --   05/16/24 1800 -- 96 14 -- -- -- -- --   05/16/24 1700 -- 91 -- 143/82 104 98 % -- --   05/16/24 1515 -- 96 -- -- -- 99 % -- --   05/16/24 1400 -- 95 16 107/71 85 98 % -- --   05/16/24 1315 -- 96 16 129/78 98 99 %       Pertinent Labs/Diagnostic Test Results:   CT abdomen pelvis with contrast   Final Result by Rosario Jennings MD (05/16 1510)      Marked inflammatory fat stranding adjacent to the sigmoid colon, with small rim-enhancing fluid collections in an area of multiple diverticula. Few extraluminal gas locules are noted. Findings most likely represent acute diverticulitis of the sigmoid    colon with perforation      Mild circumferential bladder wall thickening, which might be reactive due to  adjacent diverticulitis but recommend correlation with urinalysis to exclude coexisting cystitis.      The study was marked in EPIC for immediate notification.         Workstation performed: [id number]           5/16/24 ecg  Normal sinus rhythm  Prolonged QT  Abnormal ECG  No previous ECGs available  Normal sinus rhythm  Prolonged QT  Abnormal ECG  No previous ECGs available    Results from last 7 days   Lab Units 05/17/24  0445 05/16/24  1157   WBC Thousand/uL 7.60 11.39*   HEMOGLOBIN g/dL 11.7* 14.3   HEMATOCRIT % 34.5* 42.2   PLATELETS Thousands/uL 293 351   TOTAL NEUT ABS Thousands/µL  --  9.38*     Results from last 7 days   Lab Units 05/17/24  0445 05/16/24  1157   SODIUM mmol/L 138 135   POTASSIUM mmol/L 3.5 3.8   CHLORIDE mmol/L 106 101   CO2 mmol/L 22 25   ANION GAP mmol/L 10 9   BUN mg/dL 12 16   CREATININE mg/dL 0.79 1.01   EGFR ml/min/1.73sq m 96 79   CALCIUM mg/dL 8.2* 9.3   MAGNESIUM mg/dL 2.1  --    PHOSPHORUS mg/dL 3.3  --      Results from last 7 days   Lab Units 05/16/24  1157   AST U/L 10*   ALT U/L 8   ALK PHOS U/L 44   TOTAL PROTEIN g/dL 7.9   ALBUMIN g/dL 3.8   TOTAL BILIRUBIN mg/dL 0.80     Results from last 7 days   Lab Units 05/17/24  0445 05/16/24  1157   GLUCOSE RANDOM mg/dL 88 143*     Results from last 7 days   Lab Units 05/16/24  1634   PROTIME seconds 15.8*   INR  1.21*   PTT seconds 38*     Results from last 7 days   Lab Units 05/16/24  1634   PROCALCITONIN ng/ml 0.79*     Results from last 7 days   Lab Units 05/16/24  1634   LACTIC ACID mmol/L 0.7     Results from last 7 days   Lab Units 05/16/24  1157   LIPASE u/L <6*     Results from last 7 days   Lab Units 05/16/24  1312   CLARITY UA  Clear   COLOR UA  Dark Yellow   SPEC GRAV UA  >=1.030   PH UA  6.0   GLUCOSE UA mg/dl 30 (3/100%)*   KETONES UA mg/dl 10 (1+)*   BLOOD UA  Large*   PROTEIN UA mg/dl 100 (2+)*   NITRITE UA  Negative   BILIRUBIN UA  Small*   UROBILINOGEN UA (BE) mg/dl 2.0*   LEUKOCYTES UA  Negative   WBC UA /hpf 0-1    RBC UA /hpf 4-10*   BACTERIA UA /hpf Occasional   EPITHELIAL CELLS WET PREP /hpf Occasional   MUCUS THREADS  Occasional*     Results from last 7 days   Lab Units 05/16/24  1634   BLOOD CULTURE  Received in Microbiology Lab. Culture in Progress.  Received in Microbiology Lab. Culture in Progress.       ED Treatment:   Medication Administration from 05/16/2024 1143 to 05/16/2024 2107         Date/Time Order Dose Route Action Comments     05/16/2024 1408 EDT sodium chloride 0.9 % bolus 1,000 mL 1,000 mL Intravenous New Bag --     05/16/2024 1540 EDT piperacillin-tazobactam (ZOSYN) IVPB 4.5 g 4.5 g Intravenous New Bag --     05/16/2024 1726 EDT sodium chloride 0.9 % infusion 125 mL/hr Intravenous New Bag --     05/16/2024 1853 EDT ondansetron (ZOFRAN) injection 4 mg 4 mg Intravenous Given --     05/16/2024 1854 EDT HYDROmorphone (DILAUDID) injection 0.5 mg 0.5 mg Intravenous Given --          Past Medical History:   Diagnosis Date    Diverticulitis      Present on Admission:   Perforation of sigmoid colon due to diverticulitis      Admitting Diagnosis: Constipation [K59.00]  Perforation of sigmoid colon due to diverticulitis [K57.20]  Age/Sex: 63 y.o. male  Admission Orders:  5/16/24 1611 INPATIENT to med surg  Scheduled Medications:  heparin (porcine), 5,000 Units, Subcutaneous, Q8H ALEX  piperacillin-tazobactam, 4.5 g, Intravenous, Q8H    Continuous IV Infusions:  sodium chloride, 125 mL/hr, Intravenous, Continuous    PRN Meds:  HYDROmorphone, 0.5 mg, Intravenous, Q4H PRN at 1854 on 5/16/24 and thus far x 1 5/17/24 (0301)  ondansetron, 4 mg, Intravenous, Q6H PRN  x 1 at 1853 on 5/16/24     Bilateral SCDs  Up as tolerated.  OOB TID.   5/17/24 clears       Network Utilization Review Department  ATTENTION: Please call with any questions or concerns to [phone] and carefully listen to the prompts so that you are directed to the right person. All voicemails are confidential.   For Discharge needs, contact Care  Management DC Support Team at [phone] opt. 2  Send all requests for admission clinical reviews, approved or denied determinations and any other requests to dedicated fax number below belonging to the campus where the patient is receiving treatment. List of dedicated fax numbers for the Facilities:  FACILITY NAME UR FAX NUMBER   ADMISSION DENIALS (Administrative/Medical Necessity) [phone]   DISCHARGE SUPPORT TEAM (NETWORK) [phone]   PARENT CHILD HEALTH (Maternity/NICU/Pediatrics) [phone]   General acute hospital [phone]   [facility] [phone]   [facility] [phone]   [facility] [phone]   [facility] [phone]   [facility] [phone]   [facility] [phone]   [facility] [phone]   [facility] [phone]   [facility] [phone]   [facility] [phone]   [facility] [phone]

## 2024-05-17 NOTE — PLAN OF CARE
Problem: PAIN - ADULT  Goal: Verbalizes/displays adequate comfort level or baseline comfort level  Description: Interventions:  - Encourage patient to monitor pain and request assistance  - Assess pain using appropriate pain scale  - Administer analgesics based on type and severity of pain and evaluate response  - Implement non-pharmacological measures as appropriate and evaluate response  - Consider cultural and social influences on pain and pain management  - Notify physician/advanced practitioner if interventions unsuccessful or patient reports new pain  Outcome: Progressing     Problem: INFECTION - ADULT  Goal: Absence or prevention of progression during hospitalization  Description: INTERVENTIONS:  - Assess and monitor for signs and symptoms of infection  - Monitor lab/diagnostic results  - Monitor all insertion sites, i.e. indwelling lines, tubes, and drains  - Monitor endotracheal if appropriate and nasal secretions for changes in amount and color  - [place] appropriate cooling/warming therapies per order  - Administer medications as ordered  - Instruct and encourage patient and family to use good hand hygiene technique  - Identify and instruct in appropriate isolation precautions for identified infection/condition  Outcome: Progressing  Goal: Absence of fever/infection during neutropenic period  Description: INTERVENTIONS:  - Monitor WBC    Outcome: Progressing     Problem: SAFETY ADULT  Goal: Patient will remain free of falls  Description: INTERVENTIONS:  - Educate patient/family on patient safety including physical limitations  - Instruct patient to call for assistance with activity   - Consult OT/PT to assist with strengthening/mobility   - Keep Call bell within reach  - Keep bed low and locked with side rails adjusted as appropriate  - Keep care items and personal belongings within reach  - Initiate and maintain comfort rounds  - Make Fall Risk Sign visible to staff  - Offer Toileting every 2 Hours,  in advance of need  - Initiate/Maintain alarm  - Obtain necessary fall risk management equipment  - Apply yellow socks and bracelet for high fall risk patients  - Consider moving patient to room near nurses station  Outcome: Progressing  Goal: Maintain or return to baseline ADL function  Description: INTERVENTIONS:  -  Assess patient's ability to carry out ADLs; assess patient's baseline for ADL function and identify physical deficits which impact ability to perform ADLs (bathing, care of mouth/teeth, toileting, grooming, dressing, etc.)  - Assess/evaluate cause of self-care deficits   - Assess range of motion  - Assess patient's mobility; develop plan if impaired  - Assess patient's need for assistive devices and provide as appropriate  - Encourage maximum independence but intervene and supervise when necessary  - Involve family in performance of ADLs  - Assess for home care needs following discharge   - Consider OT consult to assist with ADL evaluation and planning for discharge  - Provide patient education as appropriate  Outcome: Progressing  Goal: Maintains/Returns to pre admission functional level  Description: INTERVENTIONS:  - Perform AM-PAC 6 Click Basic Mobility/ Daily Activity assessment daily.  - Set and communicate daily mobility goal to care team and patient/family/caregiver.   - Collaborate with rehabilitation services on mobility goals if consulted  - Perform Range of Motion 2 times a day.  - Reposition patient every 2 hours.  - Dangle patient 2 times a day  - Stand patient 2 times a day  - Ambulate patient 2 times a day  - Out of bed to chair 2 times a day   - Out of bed for meals 2 times a day  - Out of bed for toileting  - Record patient progress and toleration of activity level   Outcome: Progressing     Problem: DISCHARGE PLANNING  Goal: Discharge to home or other facility with appropriate resources  Description: INTERVENTIONS:  - Identify barriers to discharge w/patient and caregiver  - Arrange  for needed discharge resources and transportation as appropriate  - Identify discharge learning needs (meds, wound care, etc.)  - Arrange for interpretive services to assist at discharge as needed  - Refer to Case Management Department for coordinating discharge planning if the patient needs post-hospital services based on physician/advanced practitioner order or complex needs related to functional status, cognitive ability, or social support system  Outcome: Progressing     Problem: Knowledge Deficit  Goal: Patient/family/caregiver demonstrates understanding of disease process, treatment plan, medications, and discharge instructions  Description: Complete learning assessment and assess knowledge base.  Interventions:  - Provide teaching at level of understanding  - Provide teaching via preferred learning methods  Outcome: Progressing

## 2024-05-18 VITALS
OXYGEN SATURATION: 98 % | WEIGHT: 160 LBS | RESPIRATION RATE: 18 BRPM | TEMPERATURE: 97.5 F | DIASTOLIC BLOOD PRESSURE: 79 MMHG | BODY MASS INDEX: 25.71 KG/M2 | SYSTOLIC BLOOD PRESSURE: 142 MMHG | HEIGHT: 66 IN | HEART RATE: 89 BPM

## 2024-05-18 LAB
ANION GAP SERPL CALCULATED.3IONS-SCNC: 5 MMOL/L (ref 4–13)
BASOPHILS # BLD AUTO: 0.03 THOUSANDS/ÂΜL (ref 0–0.1)
BASOPHILS NFR BLD AUTO: 1 % (ref 0–1)
BUN SERPL-MCNC: 6 MG/DL (ref 5–25)
CALCIUM SERPL-MCNC: 8.5 MG/DL (ref 8.4–10.2)
CHLORIDE SERPL-SCNC: 107 MMOL/L (ref 96–108)
CO2 SERPL-SCNC: 27 MMOL/L (ref 21–32)
CREAT SERPL-MCNC: 0.73 MG/DL (ref 0.6–1.3)
EOSINOPHIL # BLD AUTO: 0.16 THOUSAND/ÂΜL (ref 0–0.61)
EOSINOPHIL NFR BLD AUTO: 3 % (ref 0–6)
ERYTHROCYTE [DISTWIDTH] IN BLOOD BY AUTOMATED COUNT: 11.9 % (ref 11.6–15.1)
GFR SERPL CREATININE-BSD FRML MDRD: 100 ML/MIN/1.73SQ M
GLUCOSE SERPL-MCNC: 100 MG/DL (ref 65–140)
HCT VFR BLD AUTO: 33.4 % (ref 36.5–49.3)
HGB BLD-MCNC: 11.4 G/DL (ref 12–17)
IMM GRANULOCYTES # BLD AUTO: 0.03 THOUSAND/UL (ref 0–0.2)
IMM GRANULOCYTES NFR BLD AUTO: 1 % (ref 0–2)
LYMPHOCYTES # BLD AUTO: 1.34 THOUSANDS/ÂΜL (ref 0.6–4.47)
LYMPHOCYTES NFR BLD AUTO: 22 % (ref 14–44)
MAGNESIUM SERPL-MCNC: 2 MG/DL (ref 1.9–2.7)
MCH RBC QN AUTO: 30.2 PG (ref 26.8–34.3)
MCHC RBC AUTO-ENTMCNC: 34.1 G/DL (ref 31.4–37.4)
MCV RBC AUTO: 89 FL (ref 82–98)
MONOCYTES # BLD AUTO: 0.63 THOUSAND/ÂΜL (ref 0.17–1.22)
MONOCYTES NFR BLD AUTO: 11 % (ref 4–12)
NEUTROPHILS # BLD AUTO: 3.83 THOUSANDS/ÂΜL (ref 1.85–7.62)
NEUTS SEG NFR BLD AUTO: 62 % (ref 43–75)
NRBC BLD AUTO-RTO: 0 /100 WBCS
PLATELET # BLD AUTO: 327 THOUSANDS/UL (ref 149–390)
PMV BLD AUTO: 11 FL (ref 8.9–12.7)
POTASSIUM SERPL-SCNC: 3.3 MMOL/L (ref 3.5–5.3)
RBC # BLD AUTO: 3.77 MILLION/UL (ref 3.88–5.62)
SODIUM SERPL-SCNC: 139 MMOL/L (ref 135–147)
WBC # BLD AUTO: 6.02 THOUSAND/UL (ref 4.31–10.16)

## 2024-05-18 PROCEDURE — 85025 COMPLETE CBC W/AUTO DIFF WBC: CPT | Performed by: SURGERY

## 2024-05-18 PROCEDURE — 80048 BASIC METABOLIC PNL TOTAL CA: CPT | Performed by: SURGERY

## 2024-05-18 PROCEDURE — 99238 HOSP IP/OBS DSCHRG MGMT 30/<: CPT | Performed by: PHYSICIAN ASSISTANT

## 2024-05-18 PROCEDURE — 83735 ASSAY OF MAGNESIUM: CPT | Performed by: SURGERY

## 2024-05-18 PROCEDURE — NC001 PR NO CHARGE: Performed by: PHYSICIAN ASSISTANT

## 2024-05-18 RX ORDER — POTASSIUM CHLORIDE 20 MEQ/1
40 TABLET, EXTENDED RELEASE ORAL ONCE
Status: COMPLETED | OUTPATIENT
Start: 2024-05-18 | End: 2024-05-18

## 2024-05-18 RX ORDER — AMOXICILLIN AND CLAVULANATE POTASSIUM 875; 125 MG/1; MG/1
1 TABLET, FILM COATED ORAL EVERY 12 HOURS SCHEDULED
Qty: 14 TABLET | Refills: 0 | Status: SHIPPED | OUTPATIENT
Start: 2024-05-18 | End: 2024-05-25

## 2024-05-18 RX ORDER — ACETAMINOPHEN 325 MG/1
650 TABLET ORAL EVERY 6 HOURS PRN
Start: 2024-05-18

## 2024-05-18 RX ORDER — OXYCODONE HYDROCHLORIDE 10 MG/1
10 TABLET ORAL EVERY 4 HOURS PRN
Status: DISCONTINUED | OUTPATIENT
Start: 2024-05-18 | End: 2024-05-18 | Stop reason: HOSPADM

## 2024-05-18 RX ORDER — OXYCODONE HYDROCHLORIDE 5 MG/1
5 TABLET ORAL EVERY 4 HOURS PRN
Status: DISCONTINUED | OUTPATIENT
Start: 2024-05-18 | End: 2024-05-18 | Stop reason: HOSPADM

## 2024-05-18 RX ADMIN — POTASSIUM CHLORIDE 40 MEQ: 1500 TABLET, EXTENDED RELEASE ORAL at 11:10

## 2024-05-18 RX ADMIN — METRONIDAZOLE 500 MG: 500 INJECTION, SOLUTION INTRAVENOUS at 05:19

## 2024-05-18 RX ADMIN — HEPARIN SODIUM 5000 UNITS: 5000 INJECTION, SOLUTION INTRAVENOUS; SUBCUTANEOUS at 05:19

## 2024-05-18 NOTE — PLAN OF CARE
Problem: PAIN - ADULT  Goal: Verbalizes/displays adequate comfort level or baseline comfort level  Description: Interventions:  - Encourage patient to monitor pain and request assistance  - Assess pain using appropriate pain scale  - Administer analgesics based on type and severity of pain and evaluate response  - Implement non-pharmacological measures as appropriate and evaluate response  - Consider cultural and social influences on pain and pain management  - Notify physician/advanced practitioner if interventions unsuccessful or patient reports new pain  Outcome: Progressing     Problem: INFECTION - ADULT  Goal: Absence or prevention of progression during hospitalization  Description: INTERVENTIONS:  - Assess and monitor for signs and symptoms of infection  - Monitor lab/diagnostic results  - Monitor all insertion sites, i.e. indwelling lines, tubes, and drains  - Monitor endotracheal if appropriate and nasal secretions for changes in amount and color  - [place] appropriate cooling/warming therapies per order  - Administer medications as ordered  - Instruct and encourage patient and family to use good hand hygiene technique  - Identify and instruct in appropriate isolation precautions for identified infection/condition  Outcome: Progressing  Goal: Absence of fever/infection during neutropenic period  Description: INTERVENTIONS:  - Monitor WBC    Outcome: Progressing     Problem: SAFETY ADULT  Goal: Patient will remain free of falls  Description: INTERVENTIONS:  - Educate patient/family on patient safety including physical limitations  - Instruct patient to call for assistance with activity   - Consult OT/PT to assist with strengthening/mobility   - Keep Call bell within reach  - Keep bed low and locked with side rails adjusted as appropriate  - Keep care items and personal belongings within reach  - Initiate and maintain comfort rounds  - Make Fall Risk Sign visible to staff    - Obtain necessary fall risk  management equipment: non skid foot wear    - Apply yellow socks and bracelet for high fall risk patients  - Consider moving patient to room near nurses station  Outcome: Progressing  Goal: Maintain or return to baseline ADL function  Description: INTERVENTIONS:  -  Assess patient's ability to carry out ADLs; assess patient's baseline for ADL function and identify physical deficits which impact ability to perform ADLs (bathing, care of mouth/teeth, toileting, grooming, dressing, etc.)  - Assess/evaluate cause of self-care deficits   - Assess range of motion  - Assess patient's mobility; develop plan if impaired  - Assess patient's need for assistive devices and provide as appropriate  - Encourage maximum independence but intervene and supervise when necessary  - Involve family in performance of ADLs  - Assess for home care needs following discharge   - Consider OT consult to assist with ADL evaluation and planning for discharge  - Provide patient education as appropriate  Outcome: Progressing  Goal: Maintains/Returns to pre admission functional level  Description: INTERVENTIONS:  - Perform AM-PAC 6 Click Basic Mobility/ Daily Activity assessment daily.  - Set and communicate daily mobility goal to care team and patient/family/caregiver.   - Collaborate with rehabilitation services on mobility goals if consulted  - Perform Range of Motion 2 times a day.  - Reposition patient every 2 hours.  - Dangle patient 2 times a day  - Stand patient 2 times a day  - Ambulate patient 2 times a day  - Out of bed to chair 2 times a day   - Out of bed for meals 2 times a day  - Out of bed for toileting  - Record patient progress and toleration of activity level   Outcome: Progressing     Problem: DISCHARGE PLANNING  Goal: Discharge to home or other facility with appropriate resources  Description: INTERVENTIONS:  - Identify barriers to discharge w/patient and caregiver  - Arrange for needed discharge resources and transportation as  appropriate  - Identify discharge learning needs (meds, wound care, etc.)  - Arrange for interpretive services to assist at discharge as needed  - Refer to Case Management Department for coordinating discharge planning if the patient needs post-hospital services based on physician/advanced practitioner order or complex needs related to functional status, cognitive ability, or social support system  Outcome: Progressing     Problem: Knowledge Deficit  Goal: Patient/family/caregiver demonstrates understanding of disease process, treatment plan, medications, and discharge instructions  Description: Complete learning assessment and assess knowledge base.  Interventions:  - Provide teaching at level of understanding  - Provide teaching via preferred learning methods  Outcome: Progressing     Problem: Nutrition/Hydration-ADULT  Goal: Nutrient/Hydration intake appropriate for improving, restoring or maintaining nutritional needs  Description: Monitor and assess patient's nutrition/hydration status for malnutrition. Collaborate with interdisciplinary team and initiate plan and interventions as ordered.  Monitor patient's weight and dietary intake as ordered or per policy. Utilize nutrition screening tool and intervene as necessary. Determine patient's food preferences and provide high-protein, high-caloric foods as appropriate.     INTERVENTIONS:  - Monitor oral intake, urinary output, labs, and treatment plans  - Assess nutrition and hydration status and recommend course of action  - Evaluate amount of meals eaten  - Assist patient with eating if necessary   - Allow adequate time for meals  - Recommend/ encourage appropriate diets, oral nutritional supplements, and vitamin/mineral supplements  - Order, calculate, and assess calorie counts as needed  - Recommend, monitor, and adjust tube feedings and TPN/PPN based on assessed needs  - Assess need for intravenous fluids  - Provide specific nutrition/hydration education as  appropriate  - Include patient/family/caregiver in decisions related to nutrition  Outcome: Progressing

## 2024-05-18 NOTE — DISCHARGE SUMMARY
"  Discharge Summary - Linus Guzmán 63 y.o. male MRN: [id number]    Unit/Bed#: -01 Encounter: [id number]    Admission Date:   Admission Orders (From admission, onward)       Ordered        05/16/24 1611  INPATIENT ADMISSION  Once                            Admitting Diagnosis: Constipation [K59.00]  Perforation of sigmoid colon due to diverticulitis [K57.20]    HPI:    Linus Guzmán is a 63 y.o. male with pmhx significant for diverticulosis who presented to ED with concerns of abdominal cramping and constipation since Sunday. Patient stated Sunday evening he began to develop \"gas like pains\" in his lower abdomen, radiating from the LLQ to RLQ. OTC pain medication has not provided him much relief. Denies any fevers, chills, N/V. Patient also mentions he has not had a BM since Sunday and has not tried any OTC bowel regimens for relief. Patient has been passing gas, reports pain and cramping sensation when it does occur. He recalls his last colonoscopy was back about 5 years ago, findings consistent with diverticulosis and was encouraged to have repeat colonoscopy in 10 years. Patient adds he does not follow up with primary care provider outpatient, mentions he has no significant past medical history and does not take any home medications. Patient denies any headaches, SOB, chest pain, back pain, or changes in bladder or bowel habits not previously mentioned above. Denies further questions or complaints.     Procedures Performed: No orders of the defined types were placed in this encounter.      Summary of Hospital Course: Patient was admitted to the hospital for conservative treatment of perforated sigmoid diverticulitis on 5/16/24. He had mild leukocytosis at 11.39 and tachycardia. He was kept NPO initially and given IV antibiotics. He was then given a clear liquid diet on 5/17, his symptoms improved and his leukocytosis resolved. He was then discharged as stable on 5/18 with a low residue diet and " Augmentin for 1 week. He resides in [place] and will follow up with a GI specialist and a surgeon.     Significant Findings, Care, Treatment and Services Provided:   Conservative management of perforated diverticulitis.    Complications: None    Discharge Diagnosis: Perforated sigmoid diverticulitis    Medical Problems       Resolved Problems  Date Reviewed: 5/18/2024   None         Condition at Discharge: stable         Discharge instructions/Information to patient and family:   See after visit summary for information provided to patient and family.      Provisions for Follow-Up Care:  See after visit summary for information related to follow-up care and any pertinent home health orders.      PCP: No primary care provider on file.    Disposition: See After Visit Summary for discharge disposition information.    Planned Readmission: No      Discharge Statement   I spent 30 minutes discharging the patient. This time was spent on the day of discharge. I had direct contact with the patient on the day of discharge. Additional documentation is required if more than 30 minutes were spent on discharge.     Discharge Medications:  See after visit summary for reconciled discharge medications provided to patient and family.

## 2024-05-18 NOTE — PROGRESS NOTES
"Progress Note - Linus Guzmán 63 y.o. male MRN: [id number]    Unit/Bed#: -01 Encounter: [id number]      Assessment/Plan:  63-year-old with PMH of diverticulosis and 1 prior episode of uncomplicated diverticulitis approximately 5 years ago admitted with 5-day history of lower abdominal pain and cramping with associated constipation.   Patient is visiting this area.  He resides in [place].      Acute diverticulitis of sigmoid colon with perforation  -CT A/P   Marked inflammatory fat stranding adjacent to the sigmoid colon, with small rim-enhancing fluid collections in an area of multiple diverticula. Few extraluminal gas locules are noted. Findings most likely represent acute diverticulitis of the sigmoid colon with perforation.   Mild circumferential bladder wall thickening, which might be reactive due to adjacent diverticulitis but recommend correlation with urinalysis to exclude coexisting cystitis.     -Last colonoscopy 5 years ago, 10 yr recall  -Abdomen with lower abdominal tenderness without guarding or peritoneal signs  -Leukocytosis improved  -Afebrile, VSS  Continue plan for conservative management  Advance to low residue diet  Nutrition consult for low res diet  Continue IV antibiotics, changed to oral for discharge  Pain control as needed  Trend abdominal exam, labs, VS  Will need outpatient colonoscopy in 6 to 8 weeks followed by surgical follow-up in [place]. Plan discussed with patient.  All questions answered  Possible d/c today if raudel diet       Subjective:   I feel improved but, I did notice some pain with moving my bowels.    Objective:     Vitals: Blood pressure 133/81, pulse 102, temperature 98.4 °F (36.9 °C), temperature source Temporal, resp. rate 18, height 5' 6\" (1.676 m), weight 72.6 kg (160 lb), SpO2 97%.,Body mass index is 25.82 kg/m².      Intake/Output Summary (Last 24 hours) at 5/18/2024 0878  Last data filed at 5/17/2024 2201  Gross per 24 hour   Intake 240 ml   Output -- "   Net 240 ml       Physical Exam: General appearance: alert and oriented, in no acute distress  Lungs: clear to auscultation bilaterally  Heart: regular rate and rhythm, S1, S2 normal, no murmur, click, rub or gallop  Abdomen: soft, non-tender; bowel sounds normal; no masses,  no organomegaly     Invasive Devices       Peripheral Intravenous Line  Duration             Peripheral IV 05/16/24 Right Antecubital 1 day                    Lab, Imaging and other studies: I have personally reviewed pertinent reports.    VTE Pharmacologic Prophylaxis: Heparin  VTE Mechanical Prophylaxis: sequential compression device

## 2024-05-21 LAB
BACTERIA BLD CULT: NORMAL
BACTERIA BLD CULT: NORMAL